# Patient Record
Sex: FEMALE | Race: WHITE | NOT HISPANIC OR LATINO | URBAN - METROPOLITAN AREA
[De-identification: names, ages, dates, MRNs, and addresses within clinical notes are randomized per-mention and may not be internally consistent; named-entity substitution may affect disease eponyms.]

---

## 2023-10-05 PROBLEM — D31.31 CHOROIDAL NEVUS OF RIGHT EYE: Status: ACTIVE | Noted: 2023-10-05

## 2023-10-05 PROBLEM — H40.003 GLAUCOMA SUSPECT, BOTH EYES: Status: ACTIVE | Noted: 2023-10-05

## 2023-10-05 PROBLEM — H40.013 OPEN ANGLE WITH BORDERLINE FINDINGS, LOW RISK, BILATERAL: Status: ACTIVE | Noted: 2023-10-05

## 2023-10-05 PROBLEM — H35.363 DRUSEN OF MACULA OF BOTH EYES: Status: ACTIVE | Noted: 2023-10-05

## 2023-10-05 PROBLEM — H02.839 DERMATOCHALASIS: Status: ACTIVE | Noted: 2023-10-05

## 2023-10-05 PROBLEM — Z87.898 HISTORY OF PREDIABETES: Status: ACTIVE | Noted: 2023-10-05

## 2023-10-05 RX ORDER — SEMAGLUTIDE 2.68 MG/ML
2 INJECTION, SOLUTION SUBCUTANEOUS
COMMUNITY

## 2023-10-05 RX ORDER — BLOOD-GLUCOSE SENSOR
EACH MISCELLANEOUS
COMMUNITY
End: 2024-01-05 | Stop reason: WASHOUT

## 2023-10-05 RX ORDER — METFORMIN HYDROCHLORIDE 500 MG/1
TABLET ORAL
COMMUNITY
Start: 2020-10-02 | End: 2024-01-05 | Stop reason: WASHOUT

## 2023-10-05 RX ORDER — CITALOPRAM 40 MG/1
40 TABLET, FILM COATED ORAL DAILY
COMMUNITY
End: 2024-01-05 | Stop reason: WASHOUT

## 2023-10-05 RX ORDER — SEMAGLUTIDE 1.34 MG/ML
INJECTION, SOLUTION SUBCUTANEOUS
COMMUNITY
Start: 2021-07-16 | End: 2023-11-06 | Stop reason: ALTCHOICE

## 2023-10-05 RX ORDER — OMEPRAZOLE 20 MG/1
CAPSULE, DELAYED RELEASE ORAL
COMMUNITY
End: 2024-01-05 | Stop reason: WASHOUT

## 2023-10-05 RX ORDER — CITALOPRAM 20 MG/1
TABLET, FILM COATED ORAL
COMMUNITY
End: 2023-11-06 | Stop reason: ALTCHOICE

## 2023-10-05 RX ORDER — METHYLPREDNISOLONE 4 MG/1
4 TABLET ORAL DAILY
COMMUNITY
End: 2023-10-06 | Stop reason: ALTCHOICE

## 2023-10-06 ENCOUNTER — FOLLOW-UP (OUTPATIENT)
Dept: OPHTHALMOLOGY | Facility: CLINIC | Age: 38
End: 2023-10-06
Payer: COMMERCIAL

## 2023-10-06 DIAGNOSIS — H40.053 OCULAR HYPERTENSION, BILATERAL: Primary | ICD-10-CM

## 2023-10-06 ASSESSMENT — ENCOUNTER SYMPTOMS
CARDIOVASCULAR NEGATIVE: 0
ALLERGIC/IMMUNOLOGIC NEGATIVE: 0
RESPIRATORY NEGATIVE: 0
CONSTITUTIONAL NEGATIVE: 0
MUSCULOSKELETAL NEGATIVE: 0
PSYCHIATRIC NEGATIVE: 0
NEUROLOGICAL NEGATIVE: 0
ENDOCRINE NEGATIVE: 0
EYES NEGATIVE: 0
HEMATOLOGIC/LYMPHATIC NEGATIVE: 0
GASTROINTESTINAL NEGATIVE: 0

## 2023-10-06 ASSESSMENT — SLIT LAMP EXAM - LIDS
COMMENTS: NORMAL
COMMENTS: NORMAL

## 2023-10-06 ASSESSMENT — TONOMETRY
OD_IOP_MMHG: 19-20
IOP_METHOD: GOLDMANN APPLANATION
OS_IOP_MMHG: 17

## 2023-10-06 ASSESSMENT — VISUAL ACUITY
OD_CC: 20/20
CORRECTION_TYPE: GLASSES
OS_CC: 20/20
METHOD: SNELLEN - LINEAR

## 2023-10-06 ASSESSMENT — CONF VISUAL FIELD
OS_INFERIOR_NASAL_RESTRICTION: 0
OS_INFERIOR_TEMPORAL_RESTRICTION: 0
OS_NORMAL: 1
OS_SUPERIOR_NASAL_RESTRICTION: 0
OS_SUPERIOR_TEMPORAL_RESTRICTION: 0

## 2023-10-06 ASSESSMENT — CUP TO DISC RATIO
OS_RATIO: 0.7
OD_RATIO: 0.7

## 2023-10-06 ASSESSMENT — EXTERNAL EXAM - LEFT EYE: OS_EXAM: NORMAL

## 2023-10-06 ASSESSMENT — EXTERNAL EXAM - RIGHT EYE: OD_EXAM: NORMAL

## 2023-10-06 NOTE — PROGRESS NOTES
Subjective   Patient ID: Leticia Bose is a 38 y.o. female.      HPI    Here for FUV Glaucoma suspect status post (s/p) SLT left eye (OS) 9/18/23 intraocular pressure (IOP) check No eye drops used   Last edited by PAULETTE Mccord on 10/6/2023 10:12 AM.        No current outpatient medications on file. (Ophthalmology pharm classes)       Current Outpatient Medications (Other)   Medication Sig Dispense Refill    blood-glucose sensor (FreeStyle Lucy 3 Sensor) device Apply new sensor every 14 days to upper arm      citalopram (CeleXA) 20 mg tablet Take by mouth.      citalopram (CeleXA) 40 mg tablet Take 1 tablet (40 mg) by mouth once daily.      empagliflozin (Jardiance) 10 mg Take by mouth.      metFORMIN (Glucophage) 500 mg tablet Take by mouth.      omeprazole (PriLOSEC) 20 mg DR capsule Take by mouth.      semaglutide (Ozempic) 0.25 mg or 0.5 mg(2 mg/1.5 mL) pen injector Inject under the skin.      semaglutide (Ozempic) 2 mg/dose (8 mg/3 mL) pen injector Inject 2 mg under the skin every 7 days.         Objective   Base Eye Exam       Visual Acuity (Snellen - Linear)         Right Left    Dist cc 20/20 20/20      Correction: Glasses              Tonometry (Goldmann Applanation, 10:17 AM)         Right Left    Pressure 19-20 17              Pupils         Pupils    Right PERRL, No APD    Left PERRL, No APD              Visual Fields         Left Right     Full               Extraocular Movement         Right Left     Full, Ortho Full, Ortho              Neuro/Psych       Oriented x3: Yes                  Slit Lamp and Fundus Exam       External Exam         Right Left    External Normal Normal              Slit Lamp Exam         Right Left    Lids/Lashes Normal Normal    Conjunctiva/Sclera White and quiet White and quiet    Cornea Clear Clear    Anterior Chamber Deep and quiet Deep and quiet    Iris Round and reactive Round and reactive    Lens Clear Clear    Anterior Vitreous Normal Normal               Fundus Exam         Right Left    C/D Ratio 0.7 0.7    Periphery small choroidal nevus superonaasl to the macular among the arcade vessels                     Assessment/Plan     Last dilated:  4/21/23    1.  Glaucoma Suspect OU:  /600.  Tm 21/22.  Pt with strong family history with father losing vision from glaucoma.  Pt with borderline IOPs, but with thicker CCTs.  Pt with possible RNFL progression, but the location is not typical of glaucoma - Extensive discussion with patient regarding whether or not to start treatment.  After ~10 minutes of discussion what treatment would entail, family history, explanation of disease monitoring parameters we have decided to not initiate treatment at this time and will recheck IOP in 3 months.  Did the full discussion of SLT R/B/A and LiGHT trial and COAST trial.  Pt changed mind and wants to initiate Rx     Status post (s/p) SLT left eye (OS) 9/18/23 pre-laser IOP= 18 mmHg -> 1 mmHg     Plan:  cont no Rx               F/u 1 month to see full effect of SLT    2.  h/o nevus OD:  no malignant signs     Plan:  cont to monitor    3.  Diabetes OU:  no diabetic retinopathy     Plan:  continue annual exams

## 2023-11-06 ENCOUNTER — OFFICE VISIT (OUTPATIENT)
Dept: OPHTHALMOLOGY | Facility: CLINIC | Age: 38
End: 2023-11-06
Payer: COMMERCIAL

## 2023-11-06 DIAGNOSIS — H40.053 OCULAR HYPERTENSION, BILATERAL: Primary | ICD-10-CM

## 2023-11-06 PROCEDURE — 99214 OFFICE O/P EST MOD 30 MIN: CPT | Performed by: OPHTHALMOLOGY

## 2023-11-06 ASSESSMENT — SLIT LAMP EXAM - LIDS
COMMENTS: NORMAL
COMMENTS: NORMAL

## 2023-11-06 ASSESSMENT — VISUAL ACUITY
METHOD: SNELLEN - LINEAR
CORRECTION_TYPE: GLASSES
OD_CC: 20/20
OS_CC: 20/20

## 2023-11-06 ASSESSMENT — ENCOUNTER SYMPTOMS
EYES NEGATIVE: 0
MUSCULOSKELETAL NEGATIVE: 0
GASTROINTESTINAL NEGATIVE: 0
ALLERGIC/IMMUNOLOGIC NEGATIVE: 0
CONSTITUTIONAL NEGATIVE: 0
ENDOCRINE NEGATIVE: 0
PSYCHIATRIC NEGATIVE: 0
CARDIOVASCULAR NEGATIVE: 0
HEMATOLOGIC/LYMPHATIC NEGATIVE: 0
RESPIRATORY NEGATIVE: 0
NEUROLOGICAL NEGATIVE: 0

## 2023-11-06 ASSESSMENT — CONF VISUAL FIELD
OD_SUPERIOR_NASAL_RESTRICTION: 0
OD_INFERIOR_TEMPORAL_RESTRICTION: 0
OS_NORMAL: 1
OD_NORMAL: 1
OS_INFERIOR_NASAL_RESTRICTION: 0
OS_INFERIOR_TEMPORAL_RESTRICTION: 0
OS_SUPERIOR_TEMPORAL_RESTRICTION: 0
OS_SUPERIOR_NASAL_RESTRICTION: 0
OD_INFERIOR_NASAL_RESTRICTION: 0
OD_SUPERIOR_TEMPORAL_RESTRICTION: 0

## 2023-11-06 ASSESSMENT — TONOMETRY
IOP_METHOD: GOLDMANN APPLANATION
OD_IOP_MMHG: 20
OS_IOP_MMHG: 17

## 2023-11-06 ASSESSMENT — EXTERNAL EXAM - LEFT EYE: OS_EXAM: NORMAL

## 2023-11-06 ASSESSMENT — EXTERNAL EXAM - RIGHT EYE: OD_EXAM: NORMAL

## 2023-11-06 ASSESSMENT — CUP TO DISC RATIO
OD_RATIO: 0.7
OS_RATIO: 0.7

## 2023-11-06 NOTE — PROGRESS NOTES
Visual Acuity (Snellen - Linear)         Right Left    Dist cc 20/20 20/20      Correction: Glasses          Tonometry       Tonometry (Goldmann Applanation, 8:53 AM)         Right Left    Pressure 20 17                  Assessment/Plan     Last dilated:  4/21/23    1.  Glaucoma Suspect OU:  /600.  Tm 21/22.  Pt with strong family history with father losing vision from glaucoma.  Pt with thicker CCTs.  Pt with possible RNFL progression, but the location is not typical of glaucoma.  Pt wanted to initiate Rx     Status post (s/p) SLT left eye (OS) 9/18/23 pre-laser IOP= 18 mmHg -> 1 mmHg, but IOPs are acceptable.  Would recommend proceeding with SLT OD.  Discussed options 1) CPM, 2) SLT  R/B/A reviewed     Plan:  cont no Rx               Recommend SLT OD (RIGHT)    2.  h/o nevus OD:  no malignant signs     Plan:  cont to monitor    3.  Diabetes OU:  no diabetic retinopathy     Plan:  continue annual exams

## 2023-12-18 ENCOUNTER — OFFICE VISIT (OUTPATIENT)
Dept: OPHTHALMOLOGY | Facility: CLINIC | Age: 38
End: 2023-12-18
Payer: COMMERCIAL

## 2023-12-18 DIAGNOSIS — H40.003 GLAUCOMA SUSPECT, BOTH EYES: Primary | ICD-10-CM

## 2023-12-18 PROCEDURE — 65855 TRABECULOPLASTY LASER SURG: CPT | Mod: RIGHT SIDE | Performed by: OPHTHALMOLOGY

## 2023-12-18 RX ORDER — PREDNISOLONE ACETATE 10 MG/ML
1 SUSPENSION/ DROPS OPHTHALMIC 4 TIMES DAILY
Qty: 5 ML | Refills: 1 | Status: SHIPPED | OUTPATIENT
Start: 2023-12-18 | End: 2023-12-22

## 2023-12-18 NOTE — PROGRESS NOTES
Not recorded       Assessment/Plan     Last dilated:  4/21/23    1.  Glaucoma Suspect OU:  /600.  Tm 21/22.  Pt with strong family history with father losing vision from glaucoma.  Pt with thicker CCTs.  Pt with possible RNFL progression, but the location is not typical of glaucoma.  Pt wanted to initiate Rx     Status post (s/p) SLT left eye (OS) 9/18/23 pre-laser IOP= 18 mmHg -> 1 mmHg, but IOPs are acceptable.  S/p SLT today OD (12/18/23), prelaser IOP = 20 mmHg ->     Plan:  cont no Rx               Proceed with SLT OD (RIGHT) today    2.  h/o nevus OD:  no malignant signs     Plan:  cont to monitor    3.  Diabetes OU:  no diabetic retinopathy     Plan:  continue annual exams

## 2024-01-05 ENCOUNTER — OFFICE VISIT (OUTPATIENT)
Dept: OPHTHALMOLOGY | Facility: CLINIC | Age: 39
End: 2024-01-05
Payer: COMMERCIAL

## 2024-01-05 DIAGNOSIS — H40.053 OCULAR HYPERTENSION, BILATERAL: Primary | ICD-10-CM

## 2024-01-05 PROCEDURE — 99213 OFFICE O/P EST LOW 20 MIN: CPT | Performed by: OPHTHALMOLOGY

## 2024-01-05 ASSESSMENT — ENCOUNTER SYMPTOMS
HEMATOLOGIC/LYMPHATIC NEGATIVE: 0
EYES NEGATIVE: 1
NEUROLOGICAL NEGATIVE: 0
CONSTITUTIONAL NEGATIVE: 0
GASTROINTESTINAL NEGATIVE: 0
PSYCHIATRIC NEGATIVE: 0
RESPIRATORY NEGATIVE: 0
ALLERGIC/IMMUNOLOGIC NEGATIVE: 0
MUSCULOSKELETAL NEGATIVE: 0
CARDIOVASCULAR NEGATIVE: 0
ENDOCRINE NEGATIVE: 0

## 2024-01-05 ASSESSMENT — CONF VISUAL FIELD
OD_NORMAL: 1
OS_INFERIOR_NASAL_RESTRICTION: 0
OS_SUPERIOR_TEMPORAL_RESTRICTION: 0
OS_INFERIOR_TEMPORAL_RESTRICTION: 0
OD_SUPERIOR_NASAL_RESTRICTION: 0
OD_INFERIOR_TEMPORAL_RESTRICTION: 0
OS_SUPERIOR_NASAL_RESTRICTION: 0
METHOD: COUNTING FINGERS
OD_SUPERIOR_TEMPORAL_RESTRICTION: 0
OD_INFERIOR_NASAL_RESTRICTION: 0
OS_NORMAL: 1

## 2024-01-05 ASSESSMENT — VISUAL ACUITY
CORRECTION_TYPE: GLASSES
OS_CC: 20/20-2
OD_CC: 20/25-2
METHOD: SNELLEN - LINEAR

## 2024-01-05 ASSESSMENT — REFRACTION_WEARINGRX
OS_AXIS: 075
OS_CYLINDER: -1.50
OD_CYLINDER: -1.75
OD_SPHERE: -0.75
OS_SPHERE: -1.00
OD_AXIS: 105

## 2024-01-05 ASSESSMENT — CUP TO DISC RATIO
OS_RATIO: 0.7
OD_RATIO: 0.7

## 2024-01-05 ASSESSMENT — EXTERNAL EXAM - RIGHT EYE: OD_EXAM: NORMAL

## 2024-01-05 ASSESSMENT — SLIT LAMP EXAM - LIDS
COMMENTS: NORMAL
COMMENTS: NORMAL

## 2024-01-05 ASSESSMENT — EXTERNAL EXAM - LEFT EYE: OS_EXAM: NORMAL

## 2024-01-05 ASSESSMENT — PACHYMETRY
OS_CT(UM): 600
OD_CT(UM): 609

## 2024-01-05 ASSESSMENT — TONOMETRY
IOP_METHOD: GOLDMANN APPLANATION
OS_IOP_MMHG: 17
OD_IOP_MMHG: 18

## 2024-01-05 NOTE — PROGRESS NOTES
Visual Acuity (Snellen - Linear)         Right Left    Dist cc 20/25-2 20/20-2      Correction: Glasses          Tonometry       Tonometry (Goldmann Applanation, 9:12 AM)         Right Left    Pressure 18 17                  Assessment/Plan     Not recorded       Assessment/Plan     Last dilated:  4/21/23    1.  Glaucoma Suspect OU:  /600.  Tm 21/22.  Pt with strong family history with father losing vision from glaucoma.  Pt with thicker CCTs.  Pt with possible RNFL progression, but the location is not typical of glaucoma.  Pt wanted to initiate Rx     Status post (s/p) SLT left eye (OS) 9/18/23 pre-laser IOP= 18 mmHg -> 1 mmHg, but IOPs are acceptable.  S/p SLT OD (12/18/23), prelaser IOP = 20 mmHg -> 2 mmHg early effect     Plan:  cont no Rx               F/u 1 month to see full effect    2.  h/o nevus OD:  no malignant signs     Plan:  cont to monitor    3.  Diabetes OU:  no diabetic retinopathy     Plan:  continue annual exams

## 2024-01-10 ENCOUNTER — APPOINTMENT (OUTPATIENT)
Dept: OPHTHALMOLOGY | Facility: CLINIC | Age: 39
End: 2024-01-10
Payer: COMMERCIAL

## 2024-02-05 ENCOUNTER — OFFICE VISIT (OUTPATIENT)
Dept: OPHTHALMOLOGY | Facility: CLINIC | Age: 39
End: 2024-02-05
Payer: COMMERCIAL

## 2024-02-05 DIAGNOSIS — H40.053 OCULAR HYPERTENSION, BILATERAL: Primary | ICD-10-CM

## 2024-02-05 PROCEDURE — 99213 OFFICE O/P EST LOW 20 MIN: CPT | Performed by: OPHTHALMOLOGY

## 2024-02-05 ASSESSMENT — PACHYMETRY
OD_CT(UM): 609
OS_CT(UM): 600

## 2024-02-05 ASSESSMENT — REFRACTION_WEARINGRX
OS_SPHERE: -1.00
OD_SPHERE: -0.75
OD_AXIS: 105
OS_CYLINDER: -1.50
OD_CYLINDER: -1.75
OS_AXIS: 075

## 2024-02-05 ASSESSMENT — VISUAL ACUITY
OS_CC: 20/20
OD_CC: 20/20
METHOD: SNELLEN - LINEAR
CORRECTION_TYPE: GLASSES

## 2024-02-05 ASSESSMENT — EXTERNAL EXAM - LEFT EYE: OS_EXAM: NORMAL

## 2024-02-05 ASSESSMENT — CONF VISUAL FIELD
OS_SUPERIOR_TEMPORAL_RESTRICTION: 0
OD_SUPERIOR_NASAL_RESTRICTION: 0
OD_INFERIOR_TEMPORAL_RESTRICTION: 0
OD_NORMAL: 1
OD_INFERIOR_NASAL_RESTRICTION: 0
OS_NORMAL: 1
OS_INFERIOR_TEMPORAL_RESTRICTION: 0
OD_SUPERIOR_TEMPORAL_RESTRICTION: 0
OS_INFERIOR_NASAL_RESTRICTION: 0
OS_SUPERIOR_NASAL_RESTRICTION: 0

## 2024-02-05 ASSESSMENT — SLIT LAMP EXAM - LIDS
COMMENTS: NORMAL
COMMENTS: NORMAL

## 2024-02-05 ASSESSMENT — CUP TO DISC RATIO
OD_RATIO: 0.7
OS_RATIO: 0.7

## 2024-02-05 ASSESSMENT — EXTERNAL EXAM - RIGHT EYE: OD_EXAM: NORMAL

## 2024-02-05 ASSESSMENT — TONOMETRY
OS_IOP_MMHG: 17-18
OD_IOP_MMHG: 18
IOP_METHOD: GOLDMANN APPLANATION

## 2024-02-05 NOTE — PROGRESS NOTES
Visual Acuity (Snellen - Linear)         Right Left    Dist cc 20/20 20/20      Correction: Glasses          Tonometry       Tonometry (Goldmann Applanation, 9:53 AM)         Right Left    Pressure 18 17-18                  Assessment/Plan   Last dilated:  4/21/23    1.  Glaucoma Suspect OU:  /600.  Tm 21/22.  Pt with strong family history with father losing vision from glaucoma.  Pt with thicker CCTs.  Pt with possible RNFL progression, but the location is not typical of glaucoma.  Pt wanted to initiate Rx     Status post (s/p) SLT left eye (OS) 9/18/23 pre-laser IOP= 18 mmHg -> 1 mmHg, but IOPs are acceptable.  S/p SLT OD (12/18/23), prelaser IOP = 20 mmHg -> 2 mmHg effect.  Given that IOPs were in the very low 20's prior to lasers and now very consistently in the upper teens, this is therapeutic.  No need to advance     Plan:  cont no Rx               F/u 3 months (HVF, dilation, RNFL)               If all stable, then t/c returning to Q6 month follow ups    2.  h/o nevus OD:  no malignant signs     Plan:  cont to monitor    3.  Diabetes OU:  no diabetic retinopathy     Plan:  continue annual exams

## 2024-05-10 ENCOUNTER — OFFICE VISIT (OUTPATIENT)
Dept: OPHTHALMOLOGY | Facility: CLINIC | Age: 39
End: 2024-05-10
Payer: COMMERCIAL

## 2024-05-10 DIAGNOSIS — H40.003 GLAUCOMA SUSPECT, BOTH EYES: Primary | ICD-10-CM

## 2024-05-10 PROCEDURE — 99214 OFFICE O/P EST MOD 30 MIN: CPT | Performed by: OPHTHALMOLOGY

## 2024-05-10 PROCEDURE — 92133 CPTRZD OPH DX IMG PST SGM ON: CPT | Performed by: OPHTHALMOLOGY

## 2024-05-10 PROCEDURE — 92083 EXTENDED VISUAL FIELD XM: CPT | Performed by: OPHTHALMOLOGY

## 2024-05-10 RX ORDER — CITALOPRAM 40 MG/1
40 TABLET, FILM COATED ORAL DAILY
COMMUNITY
Start: 2024-01-29

## 2024-05-10 RX ORDER — OMEPRAZOLE 20 MG/1
20 CAPSULE, DELAYED RELEASE ORAL EVERY OTHER DAY
COMMUNITY
Start: 2024-02-12

## 2024-05-10 ASSESSMENT — REFRACTION_WEARINGRX
OD_CYLINDER: -1.75
OD_AXIS: 105
OS_CYLINDER: -1.50
OD_SPHERE: -0.75
OS_AXIS: 075
OS_SPHERE: -1.00

## 2024-05-10 ASSESSMENT — CUP TO DISC RATIO
OS_RATIO: 0.7
OD_RATIO: 0.7

## 2024-05-10 ASSESSMENT — CONF VISUAL FIELD
OD_INFERIOR_NASAL_RESTRICTION: 0
OD_INFERIOR_TEMPORAL_RESTRICTION: 0
OS_SUPERIOR_NASAL_RESTRICTION: 0
OS_INFERIOR_TEMPORAL_RESTRICTION: 0
OD_SUPERIOR_NASAL_RESTRICTION: 0
METHOD: COUNTING FINGERS
OS_NORMAL: 1
OD_SUPERIOR_TEMPORAL_RESTRICTION: 0
OS_SUPERIOR_TEMPORAL_RESTRICTION: 0
OS_INFERIOR_NASAL_RESTRICTION: 0
OD_NORMAL: 1

## 2024-05-10 ASSESSMENT — ENCOUNTER SYMPTOMS
GASTROINTESTINAL NEGATIVE: 0
CARDIOVASCULAR NEGATIVE: 0
ENDOCRINE NEGATIVE: 0
CONSTITUTIONAL NEGATIVE: 0
EYES NEGATIVE: 1
HEMATOLOGIC/LYMPHATIC NEGATIVE: 0
NEUROLOGICAL NEGATIVE: 0
MUSCULOSKELETAL NEGATIVE: 0
PSYCHIATRIC NEGATIVE: 0
RESPIRATORY NEGATIVE: 0
ALLERGIC/IMMUNOLOGIC NEGATIVE: 0

## 2024-05-10 ASSESSMENT — PACHYMETRY
OD_CT(UM): 609
OS_CT(UM): 600

## 2024-05-10 ASSESSMENT — VISUAL ACUITY
CORRECTION_TYPE: GLASSES
OS_CC: 20/20
METHOD: SNELLEN - LINEAR
OD_CC: 20/20-2

## 2024-05-10 ASSESSMENT — EXTERNAL EXAM - RIGHT EYE: OD_EXAM: NORMAL

## 2024-05-10 ASSESSMENT — EXTERNAL EXAM - LEFT EYE: OS_EXAM: NORMAL

## 2024-05-10 ASSESSMENT — SLIT LAMP EXAM - LIDS
COMMENTS: NORMAL
COMMENTS: NORMAL

## 2024-05-10 ASSESSMENT — TONOMETRY
OS_IOP_MMHG: 18
OD_IOP_MMHG: 19
IOP_METHOD: GOLDMANN APPLANATION

## 2024-05-10 NOTE — PROGRESS NOTES
Visual Acuity (Snellen - Linear)         Right Left    Dist cc 20/20-2 20/20      Correction: Glasses          Tonometry       Tonometry (Goldmann Applanation, 9:02 AM)         Right Left    Pressure 19 18                  Assessment/Plan   Last dilated:  5/10/24    1.  Glaucoma Suspect OU:  /600.  Tm 21/22.  Pt with strong family history with father losing vision from glaucoma.  Pt with thicker CCTs.  Pt with possible RNFL progression, but the location is not typical of glaucoma.  Pt wanted to initiate Rx     Status post (s/p) SLT left eye (OS) 9/18/23 pre-laser IOP= 18 mmHg -> 1 mmHg, but IOPs are acceptable.  S/p SLT OD (12/18/23), prelaser IOP = 20 mmHg -> 2 mmHg effect.  Given that IOPs were in the very low 20's prior to lasers and now very consistently in the upper teens, this is therapeutic.  No need to advance     Plan:  cont no Rx               F/u 6 months     2.  h/o nevus OD:  no malignant signs     Plan:  cont to monitor    3.  Diabetes OU:  no diabetic retinopathy     Plan:  continue annual exams

## 2024-11-15 ENCOUNTER — APPOINTMENT (OUTPATIENT)
Dept: OPHTHALMOLOGY | Facility: CLINIC | Age: 39
End: 2024-11-15
Payer: COMMERCIAL

## 2024-11-15 DIAGNOSIS — H40.053 OCULAR HYPERTENSION, BILATERAL: Primary | ICD-10-CM

## 2024-11-15 ASSESSMENT — ENCOUNTER SYMPTOMS
EYES NEGATIVE: 1
MUSCULOSKELETAL NEGATIVE: 0
RESPIRATORY NEGATIVE: 0
ALLERGIC/IMMUNOLOGIC NEGATIVE: 0
NEUROLOGICAL NEGATIVE: 0
CARDIOVASCULAR NEGATIVE: 0
GASTROINTESTINAL NEGATIVE: 0
CONSTITUTIONAL NEGATIVE: 0
HEMATOLOGIC/LYMPHATIC NEGATIVE: 0
ENDOCRINE NEGATIVE: 0
PSYCHIATRIC NEGATIVE: 0

## 2024-11-15 ASSESSMENT — VISUAL ACUITY
OS_CC: 20/20
OD_CC: 20/20-1
METHOD: SNELLEN - LINEAR

## 2024-11-15 ASSESSMENT — EXTERNAL EXAM - RIGHT EYE: OD_EXAM: NORMAL

## 2024-11-15 ASSESSMENT — PACHYMETRY
OS_CT(UM): 600
OD_CT(UM): 609

## 2024-11-15 ASSESSMENT — REFRACTION_WEARINGRX
OS_AXIS: 075
OD_AXIS: 105
OD_CYLINDER: -1.75
OD_SPHERE: -0.75
OS_SPHERE: -1.00
OS_CYLINDER: -1.50

## 2024-11-15 ASSESSMENT — TONOMETRY
IOP_METHOD: GOLDMANN APPLANATION
OD_IOP_MMHG: 19
OS_IOP_MMHG: 19

## 2024-11-15 ASSESSMENT — SLIT LAMP EXAM - LIDS
COMMENTS: NORMAL
COMMENTS: NORMAL

## 2024-11-15 ASSESSMENT — EXTERNAL EXAM - LEFT EYE: OS_EXAM: NORMAL

## 2024-11-15 ASSESSMENT — CUP TO DISC RATIO
OD_RATIO: 0.7
OS_RATIO: 0.7

## 2024-11-15 NOTE — PROGRESS NOTES
Visual Acuity (Snellen - Linear)         Right Left    Dist cc 20/20-1 20/20          Tonometry       Tonometry (Goldmann Applanation, 8:49 AM)         Right Left    Pressure 21 21                  Assessment/Plan   Last dilated:  5/10/24    1.  Glaucoma Suspect OU:  /600.  Tm 21/22.  Pt with strong family history with father losing vision from glaucoma.  Pt with thicker CCTs.  Pt with possible RNFL progression, but the location is not typical of glaucoma.  Pt wanted to initiate Rx     Status post (s/p) SLT left eye (OS) 9/18/23 pre-laser IOP= 18 mmHg -> 1 mmHg, but IOPs are acceptable.  S/p SLT OD (12/18/23), prelaser IOP = 20 mmHg -> 2 mmHg effect.  Given that IOPs were in the very low 20's prior to lasers and now very consistently in the upper teens, this is therapeutic.  No need to advance     Plan:  cont no Rx               F/u 6 months (HVF, dilation, RNFL)    2.  h/o nevus OD:  no malignant signs     Plan:  cont to monitor    3.  Diabetes OU:  no diabetic retinopathy     Plan:  continue annual exams

## 2025-03-27 ENCOUNTER — APPOINTMENT (OUTPATIENT)
Dept: OPHTHALMOLOGY | Facility: CLINIC | Age: 40
End: 2025-03-27
Payer: COMMERCIAL

## 2025-03-27 DIAGNOSIS — H52.4 PRESBYOPIA: ICD-10-CM

## 2025-03-27 DIAGNOSIS — H52.13 MYOPIA OF BOTH EYES: Primary | ICD-10-CM

## 2025-03-27 DIAGNOSIS — H52.223 REGULAR ASTIGMATISM OF BOTH EYES: ICD-10-CM

## 2025-03-27 PROCEDURE — 92015 DETERMINE REFRACTIVE STATE: CPT | Performed by: OPTOMETRIST

## 2025-03-27 RX ORDER — BLOOD-GLUCOSE SENSOR
EACH MISCELLANEOUS
COMMUNITY
Start: 2024-11-14

## 2025-03-27 RX ORDER — ETONOGESTREL 68 MG/1
68 IMPLANT SUBCUTANEOUS
COMMUNITY

## 2025-03-27 RX ORDER — AMLODIPINE BESYLATE 5 MG/1
5 TABLET ORAL DAILY
COMMUNITY

## 2025-03-27 ASSESSMENT — REFRACTION_MANIFEST
METHOD_AUTOREFRACTION: 1
OD_SPHERE: -1.75
OD_CYLINDER: -2.75
OD_AXIS: 105
OD_SPHERE: -0.75
OS_CYLINDER: -1.75
OD_AXIS: 093
OS_AXIS: 078
OS_ADD: +1.00
OD_ADD: +1.00
OS_CYLINDER: -1.75
OS_SPHERE: -0.75
OS_SPHERE: -0.50
OD_CYLINDER: -2.50
OS_AXIS: 066

## 2025-03-27 ASSESSMENT — REFRACTION_WEARINGRX
OD_AXIS: 105
SPECS_TYPE: SVL
OS_AXIS: 075
OS_CYLINDER: -1.50
OS_SPHERE: -1.00
OD_CYLINDER: -1.75
OD_SPHERE: -0.75

## 2025-03-27 ASSESSMENT — ENCOUNTER SYMPTOMS
ALLERGIC/IMMUNOLOGIC NEGATIVE: 0
CARDIOVASCULAR NEGATIVE: 0
CONSTITUTIONAL NEGATIVE: 0
RESPIRATORY NEGATIVE: 0
HEMATOLOGIC/LYMPHATIC NEGATIVE: 0
EYES NEGATIVE: 1
MUSCULOSKELETAL NEGATIVE: 0
NEUROLOGICAL NEGATIVE: 0
GASTROINTESTINAL NEGATIVE: 0
ENDOCRINE NEGATIVE: 0
PSYCHIATRIC NEGATIVE: 0

## 2025-03-27 ASSESSMENT — CONF VISUAL FIELD
OD_NORMAL: 1
OD_SUPERIOR_TEMPORAL_RESTRICTION: 0
OD_INFERIOR_NASAL_RESTRICTION: 0
OD_SUPERIOR_NASAL_RESTRICTION: 0
OD_INFERIOR_TEMPORAL_RESTRICTION: 0
METHOD: COUNTING FINGERS
OS_NORMAL: 1
OS_INFERIOR_NASAL_RESTRICTION: 0
OS_INFERIOR_TEMPORAL_RESTRICTION: 0
OS_SUPERIOR_TEMPORAL_RESTRICTION: 0
OS_SUPERIOR_NASAL_RESTRICTION: 0

## 2025-03-27 ASSESSMENT — PACHYMETRY
OD_CT(UM): 609
OS_CT(UM): 600

## 2025-03-27 ASSESSMENT — TONOMETRY
IOP_METHOD: GOLDMANN APPLANATION
OD_IOP_MMHG: 21
OS_IOP_MMHG: 21

## 2025-03-27 ASSESSMENT — VISUAL ACUITY
CORRECTION_TYPE: GLASSES
METHOD: SNELLEN - LINEAR
OS_CC+: -1
OD_CC: 20/30
OS_CC: 20/20

## 2025-03-27 NOTE — PROGRESS NOTES
Assessment/Plan   Diagnoses and all orders for this visit:  Myopia of both eyes  Regular astigmatism of both eyes  Presbyopia  New spec rx released today per patient request. Monitor 1 year or sooner prn. Refraction only billed today. Pt consents to receiving glasses Rx today. Patient's/guardian's signature obtained to acknowledge and confirm that a paper copy of glasses Rx was given to patient in compliance with [facility] Eyeglass Rule. Electronic copy of Rx will also be available via [employer].   Discussed adaptation to first time bifocal/ PAL

## 2025-05-16 ENCOUNTER — APPOINTMENT (OUTPATIENT)
Dept: OPHTHALMOLOGY | Facility: CLINIC | Age: 40
End: 2025-05-16
Payer: COMMERCIAL

## 2025-08-01 ENCOUNTER — APPOINTMENT (OUTPATIENT)
Dept: OPHTHALMOLOGY | Facility: CLINIC | Age: 40
End: 2025-08-01
Payer: COMMERCIAL

## 2025-08-29 ENCOUNTER — APPOINTMENT (OUTPATIENT)
Dept: OPHTHALMOLOGY | Facility: CLINIC | Age: 40
End: 2025-08-29
Payer: COMMERCIAL

## 2025-08-29 DIAGNOSIS — H53.15 VISUAL DISTORTIONS OF SHAPE AND SIZE: ICD-10-CM

## 2025-08-29 DIAGNOSIS — H40.003 GLAUCOMA SUSPECT, BOTH EYES: Primary | ICD-10-CM

## 2025-08-29 RX ORDER — NATEGLINIDE 60 MG/1
60 TABLET ORAL
COMMUNITY
Start: 2025-07-16 | End: 2025-10-14

## 2025-08-29 RX ORDER — TIRZEPATIDE 12.5 MG/.5ML
INJECTION, SOLUTION SUBCUTANEOUS
COMMUNITY

## 2025-08-29 ASSESSMENT — REFRACTION_MANIFEST
OS_SPHERE: -1.00
OD_CYLINDER: -3.50
OS_AXIS: 075
OS_AXIS: 074
OS_ADD: +1.00
METHOD_AUTOREFRACTION: 1
OD_AXIS: 110
OD_AXIS: 097
OS_SPHERE: -0.75
OD_CYLINDER: -3.50
OS_CYLINDER: -1.50
OD_ADD: +1.00
OD_SPHERE: -3.00
OS_CYLINDER: -1.50
OD_SPHERE: -3.00

## 2025-08-29 ASSESSMENT — VISUAL ACUITY
CORRECTION_TYPE: GLASSES
OD_CC: 20/100-2
OD_PH_CC: 20/25-2
OS_CC: 20/20
METHOD: SNELLEN - LINEAR

## 2025-08-29 ASSESSMENT — CONF VISUAL FIELD
OS_INFERIOR_TEMPORAL_RESTRICTION: 0
METHOD: COUNTING FINGERS
OD_NORMAL: 1
OD_INFERIOR_NASAL_RESTRICTION: 0
OD_INFERIOR_TEMPORAL_RESTRICTION: 0
OD_SUPERIOR_NASAL_RESTRICTION: 0
OS_SUPERIOR_NASAL_RESTRICTION: 0
OS_SUPERIOR_TEMPORAL_RESTRICTION: 0
OD_SUPERIOR_TEMPORAL_RESTRICTION: 0
OS_INFERIOR_NASAL_RESTRICTION: 0
OS_NORMAL: 1

## 2025-08-29 ASSESSMENT — PACHYMETRY
OS_CT(UM): 600
OD_CT(UM): 609

## 2025-08-29 ASSESSMENT — ENCOUNTER SYMPTOMS
ENDOCRINE NEGATIVE: 0
CONSTITUTIONAL NEGATIVE: 0
MUSCULOSKELETAL NEGATIVE: 0
NEUROLOGICAL NEGATIVE: 0
PSYCHIATRIC NEGATIVE: 0
EYES NEGATIVE: 1
ALLERGIC/IMMUNOLOGIC NEGATIVE: 0
HEMATOLOGIC/LYMPHATIC NEGATIVE: 0
RESPIRATORY NEGATIVE: 0
CARDIOVASCULAR NEGATIVE: 0
GASTROINTESTINAL NEGATIVE: 0

## 2025-08-29 ASSESSMENT — GONIOSCOPY
OD_NASAL: D40R SL PTM
OD_INFERIOR: D40R SL PTM
OS_TEMPORAL: D40R SL PTM
OS_NASAL: D40R SL PTM
OD_TEMPORAL: D40R SL PTM
OS_INFERIOR: D40R SL PTM
OS_SUPERIOR: D40R SL PTM
OD_SUPERIOR: D40R SL PTM

## 2025-08-29 ASSESSMENT — REFRACTION_WEARINGRX
OD_SPHERE: -0.75
OD_AXIS: 105
OD_CYLINDER: -2.75
OS_CYLINDER: -1.75
OS_SPHERE: -0.75
OS_ADD: +1.00
OS_AXIS: 078
OD_ADD: +1.00

## 2025-08-29 ASSESSMENT — TONOMETRY
OD_IOP_MMHG: 19
IOP_METHOD: GOLDMANN APPLANATION
OS_IOP_MMHG: 19

## 2025-08-29 ASSESSMENT — CUP TO DISC RATIO
OS_RATIO: 0.7
OD_RATIO: 0.7

## 2025-08-29 ASSESSMENT — EXTERNAL EXAM - LEFT EYE: OS_EXAM: NORMAL

## 2025-08-29 ASSESSMENT — EXTERNAL EXAM - RIGHT EYE: OD_EXAM: NORMAL

## 2025-08-29 ASSESSMENT — SLIT LAMP EXAM - LIDS
COMMENTS: NORMAL
COMMENTS: NORMAL

## 2025-09-08 ENCOUNTER — APPOINTMENT (OUTPATIENT)
Dept: OPHTHALMOLOGY | Facility: CLINIC | Age: 40
End: 2025-09-08
Payer: COMMERCIAL

## 2025-09-25 ENCOUNTER — APPOINTMENT (OUTPATIENT)
Dept: OPHTHALMOLOGY | Facility: CLINIC | Age: 40
End: 2025-09-25
Payer: COMMERCIAL

## 2026-03-02 ENCOUNTER — APPOINTMENT (OUTPATIENT)
Dept: OPHTHALMOLOGY | Facility: CLINIC | Age: 41
End: 2026-03-02
Payer: COMMERCIAL

## 2026-04-01 ENCOUNTER — APPOINTMENT (OUTPATIENT)
Dept: OPHTHALMOLOGY | Facility: CLINIC | Age: 41
End: 2026-04-01
Payer: COMMERCIAL